# Patient Record
(demographics unavailable — no encounter records)

---

## 2025-01-07 NOTE — ASSESSMENT
[FreeTextEntry1] : 34M with mid back pain and spasm. 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 recommend FU with PCP or UC for EKG to rule out cardiac origin low likelihood but do not want to miss. MDP  We will also prescribe Muscle Relaxants as needed.  Discussed side effects including but not limited to drowsiness and dizziness.  Discussed patient should not drive after taking the medicine. OOW until 1/14/24 PT FU 6 weeks if pain worsens MRI T spine

## 2025-01-07 NOTE — IMAGING
[de-identified] : Spine: Inspection/Palpation: No tenderness to palpation throughout Cervical/thoracic/lumbar spine.  Mild TTP over left lateral ribs No pain with ROM of left shoulder  No bony step offs, No lesions.  Gait: antalgic, flexed forward posture    Neurologic:  Bilateral Lower Extremities 5/5 Iliopsoas/Quadriceps/Hamstrings/ Tibialis Anterior/ Gastrocnemius. Extensor Hallucis Longus/ Flexor Hallucis Longus except    Sensation intact to light touch L2-S1  Patellar/ Achilles reflex within normal limits.  x-ray ap/lateral thoracic with scoliosis. straightening. no fracutres

## 2025-01-07 NOTE — HISTORY OF PRESENT ILLNESS
[de-identified] :   The patient is a 34 year old male who presents today complaining of severe mid to upper back left side pain. Date of Injury/Onset: 1/7/25 Pain:    At Rest: 2/10 With Activity:  10/10 Mechanism of injury: Patient states he was holding his child with his right arm when he felt a pulse thru the left side of his back, more at base of shoulder blade. Pain scale ranges from a 2/10 sitting and 10/10 with movement.  Quality of symptoms: pulse, throbbing, tightness.  Improves with: not moving.  Worse with: standing, movement, limited ROM with arms Prior treatment: n/a Prior Imaging: n/a Additional Information: None